# Patient Record
Sex: MALE | Race: WHITE | URBAN - METROPOLITAN AREA
[De-identification: names, ages, dates, MRNs, and addresses within clinical notes are randomized per-mention and may not be internally consistent; named-entity substitution may affect disease eponyms.]

---

## 2019-06-04 DIAGNOSIS — Z31.41 FERTILITY TESTING: Primary | ICD-10-CM

## 2019-06-11 DIAGNOSIS — Z31.41 FERTILITY TESTING: ICD-10-CM

## 2019-06-11 PROCEDURE — 89322 SEMEN ANAL STRICT CRITERIA: CPT

## 2019-06-12 LAB
ABNORMAL SPERM: 98 MORPHOLOGY
ABSTINENCE DAYS: 5 DAYS (ref 2–7)
AGGLUTINATION: NO YES/NO
ANALYSIS TEMP - CENTIGRADE: 23 CENTIGRADE
CELL FRAGMENTS: ABNORMAL %
COLLECTION METHOD: ABNORMAL
COLLECTION SITE: ABNORMAL
CONSENT TO RELEASE TO PARTNER: NO
HEAD DEFECT: 98
IMMATURE SPERM: ABNORMAL %
IMMOTILE: 22 %
LAB RECEIPT TIME: ABNORMAL
LIQUEFIED: YES YES/NO
MIDPIECE DEFECT: 34
NON-PROGRESSIVE MOTILITY: 2 %
NORMAL SPERM: 2 % NORMAL FORMS (ref 4–?)
PROGRESSIVE MOTILITY: 76 % (ref 32–?)
ROUND CELLS: 0.1 MILLION/ML (ref ?–2)
SPECIMEN CONCENTRATION: 56 MILLION/ML (ref 15–?)
SPECIMEN PH: 7.6 PH (ref 7.2–?)
SPECIMEN TYPE: ABNORMAL
SPECIMEN VOL UR: 3.6 ML (ref 1.5–?)
TAIL DEFECT: 9
TIME OF ANALYSIS: ABNORMAL
TOTAL NUMBER: 202 MILLION (ref 39–?)
TOTAL PROGRESSIVE MOTILE: 154 MILLION (ref 15.6–?)
VISCOUS: NO YES/NO
VITALITY: ABNORMAL % (ref 58–?)
WBC SPECIMEN: ABNORMAL %

## 2019-08-28 ENCOUNTER — ANCILLARY PROCEDURE (OUTPATIENT)
Dept: GENERAL RADIOLOGY | Facility: CLINIC | Age: 34
End: 2019-08-28
Attending: FAMILY MEDICINE
Payer: COMMERCIAL

## 2019-08-28 ENCOUNTER — ANCILLARY PROCEDURE (OUTPATIENT)
Dept: MRI IMAGING | Facility: CLINIC | Age: 34
End: 2019-08-28
Attending: FAMILY MEDICINE
Payer: COMMERCIAL

## 2019-08-28 ENCOUNTER — OFFICE VISIT (OUTPATIENT)
Dept: ORTHOPEDICS | Facility: CLINIC | Age: 34
End: 2019-08-28
Payer: COMMERCIAL

## 2019-08-28 DIAGNOSIS — M95.8 OSTEOCHONDRAL DEFECT OF FEMORAL CONDYLE: Primary | ICD-10-CM

## 2019-08-28 DIAGNOSIS — M25.561 ACUTE PAIN OF RIGHT KNEE: ICD-10-CM

## 2019-08-28 LAB — RADIOLOGIST FLAGS: NORMAL

## 2019-08-28 RX ORDER — TRAMADOL HYDROCHLORIDE 50 MG/1
50 TABLET ORAL EVERY 6 HOURS PRN
Qty: 10 TABLET | Refills: 0 | Status: SHIPPED | OUTPATIENT
Start: 2019-08-28 | End: 2019-09-04

## 2019-08-28 NOTE — PROGRESS NOTES
CHIEF COMPLAINT:  Pain of the Right Knee       HISTORY OF PRESENT ILLNESS  Mr. Fernandez is a pleasant 34 year old year old male who presents to clinic today with right knee pain.  Sam explains that it began yesterday.  Stood up from sitting position yesterday caused clicking and mild pain. Clicking continued yesterday.  No significant swelling.  He woke up today however with severe sharp pain of right knee. Knee pain with movement. Unable to flex or fully extend knee.  Worse with motion.  Improved with rest at about 80 degrees flexion.  No significant swelling today.  Felt so poorly that he left called off from work to come in today.    Denies history of right knee pain in adolescence or recently.    Treatment to date: None    Additional history: as documented    MEDICAL HISTORY  Patient Active Problem List   Diagnosis     CARDIOVASCULAR SCREENING; LDL GOAL LESS THAN 160     Bowel habit changes     IBS (irritable bowel syndrome)     Hair loss       Current Outpatient Medications   Medication Sig Dispense Refill     finasteride (PROPECIA) 1 MG tablet Take 1 mg by mouth daily.         No Known Allergies    Family History   Problem Relation Age of Onset     Heart Disease Maternal Grandmother      Heart Disease Maternal Grandfather      Alcohol/Drug Father      Allergies Brother      Arthritis Paternal Grandmother      Lipids Mother      Lipids Maternal Grandmother        Additional medical/Social/Surgical histories reviewed in [employer] and updated as appropriate.     REVIEW OF SYSTEMS (8/28/2019)  A 10-point review of systems was obtained and is negative except for as noted in the HPI.      PHYSICAL EXAM  There were no vitals taken for this visit.    General  - normal appearance, in no obvious distress  HEENT  -Pupils equal, round, no conjunctival injection.  No lid lag  CV  - normal popliteal pulse  Pulm  - normal respiratory pattern, non-labored  Musculoskeletal - Right knee  - stance: nonweightbearing, difficulty to  bear weight or even straighten leg  - inspection: no swelling or effusion, normal bone and joint alignment, no obvious deformity  - palpation: Medial joint line tenderness exquisitely  - ROM: 90 degrees flexion, 60 degrees extension, painful in all motion  - strength: grossly intact flexion and extension  - special tests:  Unable to perform due to limited knee ROM    Neuro  - no sensory or motor deficit, grossly normal coordination, normal muscle tone  Skin  - no ecchymosis, erythema, warmth, or induration, no obvious rash  Psych  - interactive, appropriate, normal mood and affect    IMAGING : XR right knee. Final results and radiologist's interpretation, available in the [facility] health record. Images were reviewed with the patient/family members in the office today. My personal interpretation of the performed imaging is medial femoral condyle with abnormality suggestive of large OCD lesion.     ASSESSMENT & PLAN  Mr. Fernandez is a 34 year old year old male who presents to clinic today with acute right knee pain with no specific trauma x 2 days.  XR revealing findings to suggest large osteochodral defect of medial femoral condyle.    Diagnosis: Acute pain of right knee.    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  Patient was brought back and findings discussed.  An appointment was made with Dr. Garza tomorrow.  Patient to remain NWB.    I spent a total of 40 minutes face-to-face with Sam Fernandez during today's office visit.  Over 50% of this time was spent counseling the patient and/or coordinating care regarding osteochondral lesion of right knee.  See note for details.      It was a pleasure seeing Sam today.    Néstor Freitas DO, CAQSM  Primary Care  Sports Medicine

## 2019-08-28 NOTE — LETTER
Date:August 29, 2019      Patient was self referred, no letter generated. Do not send.        [facility] Health Information

## 2019-08-28 NOTE — LETTER
8/28/2019       RE: Sam Fernandez  [address]  [place] [address]     Dear Colleague,    Thank you for referring your patient, Sam Fernandez, to the [facility]TS AND [facility]THOPAEDIC WALK IN CLINIC at [facility]. Please see a copy of my visit note below.          SPORTS & ORTHOPEDIC WALK-IN VISIT 8/28/2019    Primary Care Physician:     Reason for visit:     What part of your body is injured / painful?  right knee    What caused the injury /pain? Unsure    How long ago did your injury occur or pain begin? yesterday    What are your most bothersome symptoms? Pain    How would you characterize your symptom?  sharp    What makes your symptoms better? Nothing    What makes your symptoms worse? Standing, Movement and Bending    Have you been previously seen for this problem? No    Medical History:    Any recent changes to your medical history? No    Any new medication prescribed since last visit? No    Have you had surgery on this body part before? No    Social History:    Occupation: Educator     Handedness: Right    Exercise: 3-4 days/week    Review of Systems:    Do you have fever, chills, weight loss? No    Do you have any vision problems? No    Do you have any chest pain or edema? No    Do you have any shortness of breath or wheezing?  No    Do you have stomach problems? No    Do you have any numbness or focal weakness? No    Do you have diabetes? No    Do you have problems with bleeding or clotting? No    Do you have an rashes or other skin lesions? No           CHIEF COMPLAINT:  Pain of the Right Knee       HISTORY OF PRESENT ILLNESS  Mr. Fernandez is a pleasant 34 year old year old male who presents to clinic today with right knee pain.  Sam explains that it began yesterday.  Stood up from sitting position yesterday caused clicking and mild pain. Clicking continued yesterday.  No significant swelling.  He woke up today however with severe sharp pain of right knee.  Knee pain with movement. Unable to flex or fully extend knee.  Worse with motion.  Improved with rest at about 80 degrees flexion.  No significant swelling today.  Felt so poorly that he left called off from work to come in today.    Denies history of right knee pain in adolescence or recently.    Treatment to date: None    Additional history: as documented    MEDICAL HISTORY  Patient Active Problem List   Diagnosis     CARDIOVASCULAR SCREENING; LDL GOAL LESS THAN 160     Bowel habit changes     IBS (irritable bowel syndrome)     Hair loss       Current Outpatient Medications   Medication Sig Dispense Refill     finasteride (PROPECIA) 1 MG tablet Take 1 mg by mouth daily.         No Known Allergies    Family History   Problem Relation Age of Onset     Heart Disease Maternal Grandmother      Heart Disease Maternal Grandfather      Alcohol/Drug Father      Allergies Brother      Arthritis Paternal Grandmother      Lipids Mother      Lipids Maternal Grandmother        Additional medical/Social/Surgical histories reviewed in [employer] and updated as appropriate.     REVIEW OF SYSTEMS (8/28/2019)  A 10-point review of systems was obtained and is negative except for as noted in the HPI.      PHYSICAL EXAM  There were no vitals taken for this visit.    General  - normal appearance, in no obvious distress  HEENT  -Pupils equal, round, no conjunctival injection.  No lid lag  CV  - normal popliteal pulse  Pulm  - normal respiratory pattern, non-labored  Musculoskeletal - Right knee  - stance: nonweightbearing, difficulty to bear weight or even straighten leg  - inspection: no swelling or effusion, normal bone and joint alignment, no obvious deformity  - palpation: Medial joint line tenderness exquisitely  - ROM: 90 degrees flexion, 60 degrees extension, painful in all motion  - strength: grossly intact flexion and extension  - special tests:  Unable to perform due to limited knee ROM    Neuro  - no sensory or motor deficit,  grossly normal coordination, normal muscle tone  Skin  - no ecchymosis, erythema, warmth, or induration, no obvious rash  Psych  - interactive, appropriate, normal mood and affect    IMAGING : XR right knee. Final results and radiologist's interpretation, available in the [facility] health record. Images were reviewed with the patient/family members in the office today. My personal interpretation of the performed imaging is medial femoral condyle with abnormality suggestive of large OCD lesion.     ASSESSMENT & PLAN  Mr. Fernandez is a 34 year old year old male who presents to clinic today with acute right knee pain with no specific trauma x 2 days.  XR revealing findings to suggest large osteochodral defect of medial femoral condyle.    Diagnosis: Acute pain of right knee.    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  Patient was brought back and findings discussed.  An appointment was made with Dr. Garza tomorrow.  Patient to remain NWB.    I spent a total of 40 minutes face-to-face with Sam Fernandez during today's office visit.  Over 50% of this time was spent counseling the patient and/or coordinating care regarding osteochondral lesion of right knee.  See note for details.      It was a pleasure seeing Sam today.    Néstor Freitas DO, [facility]  Primary Care Sports Medicine      Again, thank you for allowing me to participate in the care of your patient.      Sincerely,    Néstor Freitas DO

## 2019-08-28 NOTE — PROGRESS NOTES
SPORTS & ORTHOPEDIC WALK-IN VISIT 8/28/2019    Primary Care Physician:     Reason for visit:     What part of your body is injured / painful?  right knee    What caused the injury /pain? Unsure    How long ago did your injury occur or pain begin? yesterday    What are your most bothersome symptoms? Pain    How would you characterize your symptom?  sharp    What makes your symptoms better? Nothing    What makes your symptoms worse? Standing, Movement and Bending    Have you been previously seen for this problem? No    Medical History:    Any recent changes to your medical history? No    Any new medication prescribed since last visit? No    Have you had surgery on this body part before? No    Social History:    Occupation: Educator     Handedness: Right    Exercise: 3-4 days/week    Review of Systems:    Do you have fever, chills, weight loss? No    Do you have any vision problems? No    Do you have any chest pain or edema? No    Do you have any shortness of breath or wheezing?  No    Do you have stomach problems? No    Do you have any numbness or focal weakness? No    Do you have diabetes? No    Do you have problems with bleeding or clotting? No    Do you have an rashes or other skin lesions? No

## 2019-08-29 ENCOUNTER — OFFICE VISIT (OUTPATIENT)
Dept: ORTHOPEDICS | Facility: CLINIC | Age: 34
End: 2019-08-29
Payer: COMMERCIAL

## 2019-08-29 VITALS
HEIGHT: 77 IN | DIASTOLIC BLOOD PRESSURE: 58 MMHG | WEIGHT: 185 LBS | SYSTOLIC BLOOD PRESSURE: 106 MMHG | BODY MASS INDEX: 21.84 KG/M2

## 2019-08-29 DIAGNOSIS — M25.561 ACUTE PAIN OF RIGHT KNEE: Primary | ICD-10-CM

## 2019-08-29 PROCEDURE — 99203 OFFICE O/P NEW LOW 30 MIN: CPT | Mod: 57 | Performed by: ORTHOPAEDIC SURGERY

## 2019-08-29 ASSESSMENT — MIFFLIN-ST. JEOR: SCORE: 1906.53

## 2019-08-29 NOTE — LETTER
8/29/2019         RE: Sam Fernandez  [address]  [place] [address]        Dear Colleague,    Thank you for referring your patient, Sam Fernandez, to the [facility] ORTHOPEDIC SURGERY. Please see a copy of my visit note below.    CHIEF CONCERN: right knee pain    HISTORY:   Mr. Fernandez is a 34 year old male, seen today regarding his acute right knee pain. Onset of right knee pain occurred 2 days ago, 8/27/19 near 2:00am after standing up from a seated position. He states prior to the injury his knee was clicking back and forth with mild discomfort throughout the night. After he stood, he had severe pain, and was unable to weight bear.  No acute swelling in the right knee, he does note mild swelling of the right foot. He states that the pain in his right knee has diminished rapidly, pain was present at the anterior medial aspect of his knee, along his patella.  He states yesterday he was very limited in his ROM due to pain, but today his motion has much improved. He is able to straighten the right leg to almost full extension in the knee.     He went to her acute walk-in clinic they obtained x-rays and an MRI which showed a unstable OCD lesion his case was discussed with me and ultimately he was referred to my clinic for definitive management.    Specifically he denies any problems of locking catching or pain in his knee while he was in high school or as a teenager.    Current pain level: 0/10, Worst pain level: 8/10    Current Treatments:NWB, ace bandage, use of crutches, Ibuprofen, Tramdol prn (no doses today)    PAST MEDICAL HISTORY: (Reviewed with the patient and in the [facility] medical record)  1. None    PAST SURGICAL HISTORY: (Reviewed with the patient and in the [facility] medical record)  1. None, though he does have pending sinus surgery    MEDICATIONS: (Reviewed with the patient and in the [facility] medical record)    Notable medications include: Tramadol prn for severe pain    ALLERGIES: (Reviewed  with the patient and in the [facility] medical record)  1. None      SOCIAL HISTORY: (Reviewed with the patient and in the medical record)  --Tobacco: No smoking  --Occupation: Works at an elementary school  --Avocation/Sport: Enjoys working out.  Admits does not play much sports    FAMILY HISTORY: (Reviewed with the patient and in the medical record)  -- No family history of bleeding, clotting, or difficulty with anesthesia    REVIEW OF SYSTEMS: (Reviewed with the patient and on the health intake form)  -- A comprehensive 10 point review of systems was conducted and is negative except as noted in the HPI    EXAM:     General: Awake, Alert and Oriented, No acute Distress. Articulate and Interactive    Body mass index is 21.94 kg/m .    Right lower extremity :    Skin is Warm and Well perfused, no suggestion of infection    Trace effusion    No definite medial joint line tenderness    Lachman 0, no pivot shift    Stable to varus and valgus stress testing stable anterior and posterior drawer testing    1 quadrant medial translation of the patella 2 quadrants lateral.  Tilt to neutral.    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    MRI: MRI shows an unstable OCD lesion lateral aspect medial femoral condyle.  It is clearly unstable.    ASSESSMENT:  1. 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 Long discussion with the patient I discussed the my thoughts regarding this.  I do think he is a candidate for surgery.  2. I offered an examination under anesthesia right knee, right knee arthroscopy, fragment inspection and repair and bone grafting versus fragment excision.  3. Reviewed with him the risk benefits comp occasions techniques and alternatives to surgery we reviewed the expected course of  recovery and alternative treatment options.  We will look for time to schedule repeat this completed.        Again, thank you for allowing me to participate in the care of your patient.        Sincerely,        Alexandro Garza MD

## 2019-08-29 NOTE — PROGRESS NOTES
CHIEF CONCERN: right knee pain    HISTORY:   Mr. Fernandez is a 34 year old male, seen today regarding his acute right knee pain. Onset of right knee pain occurred 2 days ago, 8/27/19 near 2:00am after standing up from a seated position. He states prior to the injury his knee was clicking back and forth with mild discomfort throughout the night. After he stood, he had severe pain, and was unable to weight bear.  No acute swelling in the right knee, he does note mild swelling of the right foot. He states that the pain in his right knee has diminished rapidly, pain was present at the anterior medial aspect of his knee, along his patella.  He states yesterday he was very limited in his ROM due to pain, but today his motion has much improved. He is able to straighten the right leg to almost full extension in the knee.     He went to her acute walk-in clinic they obtained x-rays and an MRI which showed a unstable OCD lesion his case was discussed with me and ultimately he was referred to my clinic for definitive management.    Specifically he denies any problems of locking catching or pain in his knee while he was in high school or as a teenager.    Current pain level: 0/10, Worst pain level: 8/10    Current Treatments:NWB, ace bandage, use of crutches, Ibuprofen, Tramdol prn (no doses today)    PAST MEDICAL HISTORY: (Reviewed with the patient and in the [facility] medical record)  1. None    PAST SURGICAL HISTORY: (Reviewed with the patient and in the [facility] medical record)  1. None, though he does have pending sinus surgery    MEDICATIONS: (Reviewed with the patient and in the [facility] medical record)    Notable medications include: Tramadol prn for severe pain    ALLERGIES: (Reviewed with the patient and in the [facility] medical record)  1. None      SOCIAL HISTORY: (Reviewed with the patient and in the medical record)  --Tobacco: No smoking  --Occupation: Works at an elementary school  --Avocation/Sport: Enjoys working out.  Admits does  not play much sports    FAMILY HISTORY: (Reviewed with the patient and in the medical record)  -- No family history of bleeding, clotting, or difficulty with anesthesia    REVIEW OF SYSTEMS: (Reviewed with the patient and on the health intake form)  -- A comprehensive 10 point review of systems was conducted and is negative except as noted in the HPI    EXAM:     General: Awake, Alert and Oriented, No acute Distress. Articulate and Interactive    Body mass index is 21.94 kg/m .    Right lower extremity :    Skin is Warm and Well perfused, no suggestion of infection    Trace effusion    No definite medial joint line tenderness    Lachman 0, no pivot shift    Stable to varus and valgus stress testing stable anterior and posterior drawer testing    1 quadrant medial translation of the patella 2 quadrants lateral.  Tilt to neutral.    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    MRI: MRI shows an unstable OCD lesion lateral aspect medial femoral condyle.  It is clearly unstable.    ASSESSMENT:  1. 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 Long discussion with the patient I discussed the my thoughts regarding this.  I do think he is a candidate for surgery.  2. I offered an examination under anesthesia right knee, right knee arthroscopy, fragment inspection and repair and bone grafting versus fragment excision.  3. Reviewed with him the risk benefits comp occasions techniques and alternatives to surgery we reviewed the expected course of recovery and alternative treatment options.  We will look for time to schedule repeat this completed.

## 2019-08-30 ENCOUNTER — TELEPHONE (OUTPATIENT)
Dept: ORTHOPEDICS | Facility: CLINIC | Age: 34
End: 2019-08-30

## 2019-08-30 DIAGNOSIS — M25.561 ACUTE PAIN OF RIGHT KNEE: Primary | ICD-10-CM

## 2019-08-30 NOTE — TELEPHONE ENCOUNTER
Patient is scheduled for surgery with Dr. Garza    Spoke or left message with: Patient    Date of Surgery: 9/4/19    Location: [facility]    Post op: 1 week    Pre-op with surgeon (if applicable): Complete    H&P: Scheduled with [facility] NP clinic 9/3/19    Additional imaging/appointments: N/A    Surgery packet: Received in clinic     Additional comments: Patient informed surgery will depend on insurance authorization, may need to be post-poned if not authorized before surgery day.

## 2019-08-30 NOTE — TELEPHONE ENCOUNTER
[facility] Call Center    Phone Message    May a detailed message be left on voicemail: yes    Reason for Call: Other: Pt called in and stated that in clinic on 8/29 it was discussed that it was he was able to bear weight. Pt did that and is now having pain. He is wanting to know if possibly things need to be adjusted since he is unable to bear weight properly at this time. Please follow up when available. Thank you     Action Taken: Message routed to:  [facility] ([facility] Sports Med

## 2019-08-30 NOTE — TELEPHONE ENCOUNTER
Left voicemail for patient to return call to [facility], provided number for call back.     OK for patient to transition back to partial WB as tolerated, if unable he can return to NWB. He should continue with icing the knee, elevating, and taking Ibuprofen, and Tylenol for pain. Patient was provided with short Rx of Tramadol at  on 8/28/19 and can use sparingly for severe pain. Should work on gentle range of motion of the knee as well.     Christina Kimble, ATC

## 2019-08-30 NOTE — TELEPHONE ENCOUNTER
Returned patient call. He states last night he experienced the sharp jolts of pain in his knee similar to the pain he felt on 8/28/19, but rather than the pain being medial, it felt to be more lateral. He is very concerned that he has dislodged his fracture and it is moving.  When I asked if the pain felt to be deep within the knee or able to touch the knee, he states it felt to be towards the outside of his knee.  He denies any locking sensation within the knee. And denies any trips, slips, twisting, or impacting movements to the knee.     He states that today his pain is very minimal again, and has been managing with Ibuprofen, but afraid to move or bear weight.  I encouraged him to use his crutches while at home, and not to hop on one leg for fear of falling and causing greater injury. I also encouraged him to ice the knee, in addition to continuing his regimen of  ibuprofen he could alternate with Tylenol per instructions listed on the bottle:1000mg every 6 hours with food (Maximum of 3000mg/day)    I was able to reach out to Ashlee John ATC at the [facility] and she was able to coordinate with Walk in Clinic Staff for patient to obtain a brace for support.  I also provided the patient with  Surgery Scheduler: Saniya's number to reschedule surgery at a sooner date per Dr. Garza's availability.     He was agreeable with plan, and will go to the Walk-in Clinic today.  The address was confirmed with patient.       Christina Kimble ATC

## 2019-08-30 NOTE — TELEPHONE ENCOUNTER
Patient left voicemail returning Christina's call. His concern is that there is a fragment of bone floating around his kneecap. When it gets in a certain position,  the pain can be extreme. He feels he does not have control over this.   Surgery isn't scheduled until October 11th.  Nervous of what he is supposed to do for the next 6 weeks when he can't even move around his house. States it is an ordeal and has to hop to and from the bathroom that is 10 feet away.   Asking if surgery was scheduled so far out because it may be considered elective and not urgent.   Yesterday Dr. Garza told him he could put weight on leg. However, since the cartilage fragment seems to move around and cause pain is in a different place than yesterday, he feels it is more serious than initially thought.    He is also nervous about being out of work for 6 weeks.     He can be reached at: [phone].     JERILYN Lara RN

## 2019-09-03 ENCOUNTER — ANESTHESIA EVENT (OUTPATIENT)
Dept: SURGERY | Facility: AMBULATORY SURGERY CENTER | Age: 34
End: 2019-09-03

## 2019-09-03 ENCOUNTER — OFFICE VISIT (OUTPATIENT)
Dept: FAMILY MEDICINE | Facility: CLINIC | Age: 34
End: 2019-09-03
Payer: COMMERCIAL

## 2019-09-03 VITALS
HEART RATE: 72 BPM | TEMPERATURE: 97.8 F | OXYGEN SATURATION: 97 % | DIASTOLIC BLOOD PRESSURE: 71 MMHG | WEIGHT: 190 LBS | HEIGHT: 77 IN | RESPIRATION RATE: 16 BRPM | BODY MASS INDEX: 22.43 KG/M2 | SYSTOLIC BLOOD PRESSURE: 119 MMHG

## 2019-09-03 DIAGNOSIS — Z01.818 PRE-OPERATIVE GENERAL PHYSICAL EXAMINATION: Primary | ICD-10-CM

## 2019-09-03 RX ORDER — TADALAFIL 5 MG/1
5 TABLET ORAL DAILY
COMMUNITY
Start: 2019-08-12

## 2019-09-03 ASSESSMENT — MIFFLIN-ST. JEOR: SCORE: 1929.21

## 2019-09-03 ASSESSMENT — PAIN SCALES - GENERAL: PAINLEVEL: NO PAIN (0)

## 2019-09-03 NOTE — PATIENT INSTRUCTIONS

## 2019-09-03 NOTE — NURSING NOTE
Chief Complaint   Patient presents with     Pre-Op Exam     Pt is here for a pre op exam.         AUDI Damon on 9/3/2019 at 3:19 PM

## 2019-09-03 NOTE — PROGRESS NOTES
[facility] NURSE PRACTITIONER'S CLINIC  [address]  5th Floor  [facility] [address]  [phone]  Dept: [phone]    PRE-OP EVALUATION:  Today's date: 9/3/2019    Sam Fernandez (: 1985) presents for pre-operative evaluation assessment as requested by Dr. Alexandro Garza.  He requires evaluation and anesthesia risk assessment prior to undergoing surgery/procedure for treatment of unstable osteochondritis dissecans .  Proposed procedure: right knee arthroscopy, fragment inspection and repair.    Date of Surgery/ Procedure: 19  Time of Surgery/ Procedure: 1:30 pm  [place] [facility] [place] Physician: Pre-op PE by Felicia Belcher CNP  Type of Anesthesia Anticipated: General    Patient has a Health Care Directive or Living Will:  NO    Preop Questions 9/3/2019   Who is doing your surgery? Dr Garza   What are you having done? arthoscopic knee surgery - right knee   Date of Surgery/Procedure:    Facility or Hospital where procedure/surgery will be performed: [facility]   1.  Do you have a history of Heart attack, stroke, stent, coronary bypass surgery, or other heart surgery? No   2.  Do you ever have any pain or discomfort in your chest? No   3.  Do you have a history of  Heart Failure? No   4.   Are you troubled by shortness of breath when:  walking on a level surface, or up a slight hill, or at night? No   5.  Do you currently have a cold, bronchitis or other respiratory infection? No   6.  Do you have a cough, shortness of breath, or wheezing? No   7.  Do you sometimes get pains in the calves of your legs when you walk? No   8. Do you or anyone in your family have previous history of blood clots? No   9.  Do you or does anyone in your family have a serious bleeding problem such as prolonged bleeding following surgeries or cuts? No   10. Have you ever had problems with anemia or been told to take iron pills? No   11. Have you had  any abnormal blood loss such as black, tarry or bloody stools? No   12. Have you ever had a blood transfusion? No   13. Have you or any of your relatives ever had problems with anesthesia? No   14. Do you have sleep apnea, excessive snoring or daytime drowsiness? No   15. Do you have any prosthetic heart valves? No   16. Do you have prosthetic joints? No         HPI:                                                      Brief HPI related to upcoming procedure: One week ago Sam stood up and felt a severe pain in his right knee.  He did not have any injury, he has felt it clicking at times, but not frequently before one week ago.  The pain was so difficult, he was not able to weight bear.      Sam saw Dr. Garza 8/29/19 who recommended surgery.      MEDICAL HISTORY:                                                      Patient Active Problem List    Diagnosis Date Noted     CARDIOVASCULAR SCREENING; LDL GOAL LESS THAN 160 11/01/2011     Priority: Medium     Bowel habit changes 11/01/2011     Priority: Medium     IBS (irritable bowel syndrome) 11/01/2011     Priority: Medium     Hair loss 11/01/2011     Priority: Medium      Past Medical History:   Diagnosis Date     Hair loss 11/1/2011     IBS (irritable bowel syndrome) 11/1/2011     Traveler's diarrhea may 2011    at , treated with unknown antibiotic at [facility], phone consultaion.     Past Surgical History:   Procedure Laterality Date     testicular tortion  1998     wisdom teeth     Ziaflex procedure for Peyronie's disease  Current Outpatient Medications   Medication Sig Dispense Refill     tadalafil (CIALIS) 5 MG tablet Take 5 mg by mouth daily       finasteride (PROPECIA) 1 MG tablet Take 1 mg by mouth daily.       traMADol (ULTRAM) 50 MG tablet Take 1 tablet (50 mg) by mouth every 6 hours as needed for severe pain (Patient not taking: Reported on 9/3/2019) 10 tablet 0     OTC products: None, except as noted above    No Known Allergies   Latex  "Allergy: NO    Social History     Tobacco Use     Smoking status: Never Smoker     Smokeless tobacco: Never Used   Substance Use Topics     Alcohol use: No     History   Drug Use No       REVIEW OF SYSTEMS:                                                    CONSTITUTIONAL: NEGATIVE for fever, chills, change in weight  INTEGUMENTARY/SKIN: NEGATIVE for worrisome rashes, moles or lesions  EYES: NEGATIVE for vision changes or irritation  ENT/MOUTH: NEGATIVE for ear, mouth and throat problems  RESP: NEGATIVE for significant cough or SOB  BREAST: NEGATIVE for masses, tenderness or discharge  CV: NEGATIVE for chest pain, palpitations or peripheral edema  GI: NEGATIVE for nausea, abdominal pain, heartburn, or change in bowel habits  : NEGATIVE for frequency, dysuria, or hematuria.  History of Pyronie's disease.    MUSCULOSKELETAL: NEGATIVE for significant arthralgias or myalgia  NEURO: NEGATIVE for weakness, dizziness or paresthesias  ENDOCRINE: NEGATIVE for temperature intolerance, skin/hair changes  HEME: NEGATIVE for bleeding problems  PSYCHIATRIC: NEGATIVE for changes in mood or affect    EXAM:                                                    /71 (BP Location: Right arm, Patient Position: Sitting, Cuff Size: Adult Regular)   Pulse 72   Temp 97.8  F (36.6  C) (Oral)   Resp 16   Ht 1.956 m (6' 5\")   Wt 86.2 kg (190 lb)   SpO2 97%   BMI 22.53 kg/m      GENERAL APPEARANCE: healthy, alert and no distress     EYES: EOMI,  PERRL     HENT: ear canals and TM's normal and nose and mouth without ulcers or lesions     NECK: no adenopathy, no asymmetry, masses, or scars and thyroid normal to palpation     RESP: lungs clear to auscultation - no rales, rhonchi or wheezes     CV: regular rates and rhythm, normal S1 S2, no S3 or S4 and no murmur, click or rub     ABDOMEN:  soft, nontender, no HSM or masses and bowel sounds normal     MS: right knee swollen and ROM limited by pain and stiffness.  Painful weight bearing, " using crutches.  Swelling in right ankle.       SKIN: no suspicious lesions or rashes     NEURO: Normal strength and tone, sensory exam grossly normal, mentation intact and speech normal     PSYCH: mentation appears normal. and affect normal/bright     LYMPHATICS: No cervical adenopathy    DIAGNOSTICS:                                                    No labs or EKG required for low risk surgery (cataract, skin procedure, breast biopsy, etc)    No results for input(s): HGB, PLT, INR, NA, POTASSIUM, CR, A1C in the last 54325 hours.     IMPRESSION:                                                    Reason for surgery/procedure: unstable osteochondritis dissecans, repair  Diagnosis/reason for consult: Pre-op physical exam    The proposed surgical procedure is considered INTERMEDIATE risk.    REVISED CARDIAC RISK INDEX  The patient has the following serious cardiovascular risks for perioperative complications such as (MI, PE, VFib and 3  AV Block):  No serious cardiac risks  INTERPRETATION: 0 risks: Class I (very low risk - 0.4% complication rate)    The patient has the following additional risks for perioperative complications:  No identified additional risks    APPROVAL GIVEN to proceed with proposed procedure, without further diagnostic evaluation    No diagnosis found.    RECOMMENDATIONS:                                                    --Consult hospital rounder / IM to assist post-op medical management     Signed Electronically by: Felicia Belcher NP    Copy of this evaluation report is provided to requesting physician.    [place] Preop Guidelines

## 2019-09-04 ENCOUNTER — HOSPITAL ENCOUNTER (OUTPATIENT)
Facility: AMBULATORY SURGERY CENTER | Age: 34
End: 2019-09-04
Attending: ORTHOPAEDIC SURGERY
Payer: COMMERCIAL

## 2019-09-04 ENCOUNTER — ANESTHESIA (OUTPATIENT)
Dept: SURGERY | Facility: AMBULATORY SURGERY CENTER | Age: 34
End: 2019-09-04

## 2019-09-04 ENCOUNTER — ANCILLARY PROCEDURE (OUTPATIENT)
Dept: RADIOLOGY | Facility: AMBULATORY SURGERY CENTER | Age: 34
End: 2019-09-04
Attending: ORTHOPAEDIC SURGERY
Payer: COMMERCIAL

## 2019-09-04 VITALS
OXYGEN SATURATION: 97 % | TEMPERATURE: 96.9 F | RESPIRATION RATE: 15 BRPM | BODY MASS INDEX: 22.43 KG/M2 | DIASTOLIC BLOOD PRESSURE: 66 MMHG | HEART RATE: 52 BPM | WEIGHT: 190 LBS | HEIGHT: 77 IN | SYSTOLIC BLOOD PRESSURE: 116 MMHG

## 2019-09-04 DIAGNOSIS — M95.8 OSTEOCHONDRAL DEFECT: Primary | ICD-10-CM

## 2019-09-04 DIAGNOSIS — R52 PAIN: ICD-10-CM

## 2019-09-04 DEVICE — IMPLANTABLE DEVICE: Type: IMPLANTABLE DEVICE | Site: KNEE | Status: FUNCTIONAL

## 2019-09-04 RX ORDER — ACETAMINOPHEN 325 MG/1
325-650 TABLET ORAL EVERY 6 HOURS PRN
COMMUNITY

## 2019-09-04 RX ORDER — DEXAMETHASONE SODIUM PHOSPHATE 4 MG/ML
INJECTION, SOLUTION INTRA-ARTICULAR; INTRALESIONAL; INTRAMUSCULAR; INTRAVENOUS; SOFT TISSUE PRN
Status: DISCONTINUED | OUTPATIENT
Start: 2019-09-04 | End: 2019-09-04

## 2019-09-04 RX ORDER — PROPOFOL 10 MG/ML
INJECTION, EMULSION INTRAVENOUS PRN
Status: DISCONTINUED | OUTPATIENT
Start: 2019-09-04 | End: 2019-09-04

## 2019-09-04 RX ORDER — ONDANSETRON 2 MG/ML
INJECTION INTRAMUSCULAR; INTRAVENOUS PRN
Status: DISCONTINUED | OUTPATIENT
Start: 2019-09-04 | End: 2019-09-04

## 2019-09-04 RX ORDER — AMOXICILLIN 250 MG
1-2 CAPSULE ORAL 2 TIMES DAILY
Qty: 30 TABLET | Refills: 0 | Status: SHIPPED | OUTPATIENT
Start: 2019-09-04

## 2019-09-04 RX ORDER — MEPERIDINE HYDROCHLORIDE 25 MG/ML
12.5 INJECTION INTRAMUSCULAR; INTRAVENOUS; SUBCUTANEOUS
Status: DISCONTINUED | OUTPATIENT
Start: 2019-09-04 | End: 2019-09-05 | Stop reason: HOSPADM

## 2019-09-04 RX ORDER — CEFAZOLIN SODIUM 2 G/50ML
2 SOLUTION INTRAVENOUS
Status: COMPLETED | OUTPATIENT
Start: 2019-09-04 | End: 2019-09-04

## 2019-09-04 RX ORDER — ACETAMINOPHEN 325 MG/1
975 TABLET ORAL ONCE
Status: COMPLETED | OUTPATIENT
Start: 2019-09-04 | End: 2019-09-04

## 2019-09-04 RX ORDER — OMEGA-3 FATTY ACIDS/FISH OIL 300-1000MG
400 CAPSULE ORAL EVERY 4 HOURS PRN
COMMUNITY

## 2019-09-04 RX ORDER — ACETAMINOPHEN 325 MG/1
650 TABLET ORAL EVERY 4 HOURS PRN
Qty: 50 TABLET | Refills: 0 | Status: SHIPPED | OUTPATIENT
Start: 2019-09-04

## 2019-09-04 RX ORDER — OXYCODONE HYDROCHLORIDE 5 MG/1
5 TABLET ORAL EVERY 4 HOURS PRN
Status: DISCONTINUED | OUTPATIENT
Start: 2019-09-04 | End: 2019-09-05 | Stop reason: HOSPADM

## 2019-09-04 RX ORDER — FENTANYL CITRATE 50 UG/ML
INJECTION, SOLUTION INTRAMUSCULAR; INTRAVENOUS PRN
Status: DISCONTINUED | OUTPATIENT
Start: 2019-09-04 | End: 2019-09-04

## 2019-09-04 RX ORDER — HYDROXYZINE HYDROCHLORIDE 25 MG/1
25 TABLET, FILM COATED ORAL
Status: COMPLETED | OUTPATIENT
Start: 2019-09-04 | End: 2019-09-04

## 2019-09-04 RX ORDER — OXYCODONE HYDROCHLORIDE 5 MG/1
5 TABLET ORAL
Status: COMPLETED | OUTPATIENT
Start: 2019-09-04 | End: 2019-09-04

## 2019-09-04 RX ORDER — NALOXONE HYDROCHLORIDE 0.4 MG/ML
.1-.4 INJECTION, SOLUTION INTRAMUSCULAR; INTRAVENOUS; SUBCUTANEOUS
Status: DISCONTINUED | OUTPATIENT
Start: 2019-09-04 | End: 2019-09-05 | Stop reason: HOSPADM

## 2019-09-04 RX ORDER — PROPOFOL 10 MG/ML
INJECTION, EMULSION INTRAVENOUS CONTINUOUS PRN
Status: DISCONTINUED | OUTPATIENT
Start: 2019-09-04 | End: 2019-09-04

## 2019-09-04 RX ORDER — ONDANSETRON 4 MG/1
4 TABLET, ORALLY DISINTEGRATING ORAL
Status: DISCONTINUED | OUTPATIENT
Start: 2019-09-04 | End: 2019-09-05 | Stop reason: HOSPADM

## 2019-09-04 RX ORDER — KETOROLAC TROMETHAMINE 30 MG/ML
INJECTION, SOLUTION INTRAMUSCULAR; INTRAVENOUS PRN
Status: DISCONTINUED | OUTPATIENT
Start: 2019-09-04 | End: 2019-09-04

## 2019-09-04 RX ORDER — CEFAZOLIN SODIUM 1 G/50ML
1 SOLUTION INTRAVENOUS SEE ADMIN INSTRUCTIONS
Status: DISCONTINUED | OUTPATIENT
Start: 2019-09-04 | End: 2019-09-04 | Stop reason: HOSPADM

## 2019-09-04 RX ORDER — ONDANSETRON 2 MG/ML
4 INJECTION INTRAMUSCULAR; INTRAVENOUS EVERY 30 MIN PRN
Status: DISCONTINUED | OUTPATIENT
Start: 2019-09-04 | End: 2019-09-05 | Stop reason: HOSPADM

## 2019-09-04 RX ORDER — BUPIVACAINE HYDROCHLORIDE AND EPINEPHRINE 2.5; 5 MG/ML; UG/ML
INJECTION, SOLUTION INFILTRATION; PERINEURAL PRN
Status: DISCONTINUED | OUTPATIENT
Start: 2019-09-04 | End: 2019-09-04 | Stop reason: HOSPADM

## 2019-09-04 RX ORDER — SODIUM CHLORIDE, SODIUM LACTATE, POTASSIUM CHLORIDE, CALCIUM CHLORIDE 600; 310; 30; 20 MG/100ML; MG/100ML; MG/100ML; MG/100ML
INJECTION, SOLUTION INTRAVENOUS CONTINUOUS
Status: DISCONTINUED | OUTPATIENT
Start: 2019-09-04 | End: 2019-09-04 | Stop reason: HOSPADM

## 2019-09-04 RX ORDER — ACETAMINOPHEN 325 MG/1
650 TABLET ORAL
Status: DISCONTINUED | OUTPATIENT
Start: 2019-09-04 | End: 2019-09-05 | Stop reason: HOSPADM

## 2019-09-04 RX ORDER — FENTANYL CITRATE 50 UG/ML
25-50 INJECTION, SOLUTION INTRAMUSCULAR; INTRAVENOUS
Status: DISCONTINUED | OUTPATIENT
Start: 2019-09-04 | End: 2019-09-04 | Stop reason: HOSPADM

## 2019-09-04 RX ORDER — ONDANSETRON 4 MG/1
4-8 TABLET, ORALLY DISINTEGRATING ORAL EVERY 8 HOURS PRN
Qty: 4 TABLET | Refills: 0 | Status: SHIPPED | OUTPATIENT
Start: 2019-09-04

## 2019-09-04 RX ORDER — GABAPENTIN 300 MG/1
300 CAPSULE ORAL ONCE
Status: COMPLETED | OUTPATIENT
Start: 2019-09-04 | End: 2019-09-04

## 2019-09-04 RX ORDER — ONDANSETRON 4 MG/1
4 TABLET, ORALLY DISINTEGRATING ORAL EVERY 30 MIN PRN
Status: DISCONTINUED | OUTPATIENT
Start: 2019-09-04 | End: 2019-09-05 | Stop reason: HOSPADM

## 2019-09-04 RX ORDER — OXYCODONE HYDROCHLORIDE 5 MG/1
5-10 TABLET ORAL EVERY 4 HOURS PRN
Qty: 30 TABLET | Refills: 0 | Status: SHIPPED | OUTPATIENT
Start: 2019-09-04 | End: 2019-09-11

## 2019-09-04 RX ORDER — SODIUM CHLORIDE, SODIUM LACTATE, POTASSIUM CHLORIDE, CALCIUM CHLORIDE 600; 310; 30; 20 MG/100ML; MG/100ML; MG/100ML; MG/100ML
INJECTION, SOLUTION INTRAVENOUS CONTINUOUS
Status: DISCONTINUED | OUTPATIENT
Start: 2019-09-04 | End: 2019-09-05 | Stop reason: HOSPADM

## 2019-09-04 RX ORDER — LIDOCAINE HYDROCHLORIDE 20 MG/ML
INJECTION, SOLUTION INFILTRATION; PERINEURAL PRN
Status: DISCONTINUED | OUTPATIENT
Start: 2019-09-04 | End: 2019-09-04

## 2019-09-04 RX ADMIN — CEFAZOLIN SODIUM 2 G: 2 SOLUTION INTRAVENOUS at 13:50

## 2019-09-04 RX ADMIN — GABAPENTIN 300 MG: 300 CAPSULE ORAL at 12:17

## 2019-09-04 RX ADMIN — KETOROLAC TROMETHAMINE 30 MG: 30 INJECTION, SOLUTION INTRAMUSCULAR; INTRAVENOUS at 14:10

## 2019-09-04 RX ADMIN — ONDANSETRON 4 MG: 2 INJECTION INTRAMUSCULAR; INTRAVENOUS at 14:04

## 2019-09-04 RX ADMIN — PROPOFOL 200 MCG/KG/MIN: 10 INJECTION, EMULSION INTRAVENOUS at 13:55

## 2019-09-04 RX ADMIN — LIDOCAINE HYDROCHLORIDE 100 MG: 20 INJECTION, SOLUTION INFILTRATION; PERINEURAL at 13:52

## 2019-09-04 RX ADMIN — SODIUM CHLORIDE, SODIUM LACTATE, POTASSIUM CHLORIDE, CALCIUM CHLORIDE: 600; 310; 30; 20 INJECTION, SOLUTION INTRAVENOUS at 12:18

## 2019-09-04 RX ADMIN — HYDROXYZINE HYDROCHLORIDE 25 MG: 25 TABLET, FILM COATED ORAL at 16:17

## 2019-09-04 RX ADMIN — PROPOFOL 200 MG: 10 INJECTION, EMULSION INTRAVENOUS at 13:54

## 2019-09-04 RX ADMIN — FENTANYL CITRATE 50 MCG: 50 INJECTION, SOLUTION INTRAMUSCULAR; INTRAVENOUS at 14:02

## 2019-09-04 RX ADMIN — OXYCODONE HYDROCHLORIDE 5 MG: 5 TABLET ORAL at 16:17

## 2019-09-04 RX ADMIN — DEXAMETHASONE SODIUM PHOSPHATE 4 MG: 4 INJECTION, SOLUTION INTRA-ARTICULAR; INTRALESIONAL; INTRAMUSCULAR; INTRAVENOUS; SOFT TISSUE at 14:04

## 2019-09-04 RX ADMIN — PROPOFOL 100 MG: 10 INJECTION, EMULSION INTRAVENOUS at 13:55

## 2019-09-04 RX ADMIN — ACETAMINOPHEN 975 MG: 325 TABLET ORAL at 12:17

## 2019-09-04 ASSESSMENT — MIFFLIN-ST. JEOR: SCORE: 1929.21

## 2019-09-04 NOTE — ANESTHESIA CARE TRANSFER NOTE
Patient: Sam Fernandez    Procedure(s):  Examination Under Anesthesia Right Knee, Right Knee Arthroscopy, OCD fixation    Diagnosis: Unstable Osteochondral Dissecans Lesions  Diagnosis Additional Information: No value filed.    Anesthesia Type:   General     Note:  Airway :Oral Airway and Room Air  Patient transferred to:PACU  Comments: Blue Report: Identifed the Patient, Identified the Reponsible Provider, Reviewed the pertinent medical history, Discussed the surgical course, Reviewed Intra-OP anesthesia mangement and issues during anesthesia, Set expectations for post-procedure period and Allowed opportunity for questions and acknowledgement of understanding      Vitals: (Last set prior to Anesthesia Care Transfer)    CRNA VITALS  9/4/2019 1522 - 9/4/2019 1556      9/4/2019             Pulse:  52    SpO2:  99 %    Resp Rate (observed):  9                Electronically Signed By: ANGELO Vora CRNA  September 4, 2019  3:56 PM

## 2019-09-04 NOTE — OP NOTE
PREOPERATIVE DIAGNOSIS:   1. Right knee medial femoral condyle unstable osteochondritis dissecans lesion    POSTOPERATIVE DIAGNOSIS:  1. Right knee medial femoral condyle unstable osteochondritis dissecans lesion, repairable    PROCEDURE:  1. Examination under anesthesia right knee  2. Right knee arthroscopy  3. Open reduction internal fixation and bone grafting of osteochondritis dissecans lesion medial femoral condyle right knee    DATE OF SURGERY: 9/4/2019    SURGEON: Alexandro Garza MD    ASSISTANT: None.     RESIDENT OR FELLOW: Tristan Wilson MD    OPERATIVE INDICATIONS: Sam Fernandez is a pleasant 34 year old male who I saw through my orthopedic clinic with a history, physical, imaging consistent with right unstable osteochondritis dissecans lesion.  Present to my clinic.  He actually did very painful episode.  Then he was not painful I ultimately offered him surgery.  The plan for surgery was examination under anesthesia, knee arthroscopy and inspection of the fragment.  If it was repairable I then plan for open reduction internal fixation and with bone grafting from his proximal tibia.  I also discussed with him the possibility of fragment excision of the need for future cartilage reconstruction we discussed the divergent recovery pathways the fact that repair of this OCD lesion may allow improvement in the long-term health of his knee and may be able to avoid cartilage reconstructive surgery.  I reviewed with the patient the risks, benefits, complications, techniques and alternatives to surgery.  We reviewed the expected course of recovery and the potential expected outcomes.  The patient understood both the risks and benefits and desired to proceed despite the risks.    OPERATIVE DETAILS: In the preoperative area the patient's informed consent was reviewed and they desired to proceed.  The right leg was marked and the patient was in agreement.  The patient was taken to the operating room where a timeout  was performed and all parties were in agreement.  Preoperative antibiotics were given within 1 hour of the time of incision.  The patient was placed in the supine position and surrendered to LMA anesthesia.  No tourniquet was applied.  Egg crate was placed beneath the well leg and a side post was utilized.  The operative leg was prepped and draped in the usual sterile fashion.     Examination Under Anesthesia:    Anteromedial and anterolateral arthroscopic portals were created and a diagnostic arthroscopy was performed to find findings: Medial tibial plateau medial meniscus normal lateral femoral condyle, lateral tibial plateau, lateral meniscus normal medial patella facet, central ridge lateral patella facet normal trochlea normal.  Medial femoral condyle showed a large unstable OCD lesion.  Clearly ballotable.  It appeared intact to repair.  There was bone in the deep aspect of it.  At this time the medial portal was utilized carried out through the skin and subcutaneous tissues meticulous hemostasis was insured.  Medial parapatellar arthrotomy was completed.  We opened the fragment and a curette was used to debride the bed to good bed of bleeding bone.  Marrow venting was then completed with a small-caliber drill bit and again we saw good bed of bleeding bone.  We then turned our attention to Ana Luisa's tubercle where a 2 cm incision was made carried onto the skin and subcutis tissues meticulous hemostasis was insured cortical window was opened and autogenous bone was harvested from the proximal tibia.  This was then placed into the defect.  We replaced a cortical window on the lateral side.  And a layered closure was initiated.  With the fragment gently opened our bone graft was placed into the defect the fragment was then returned it was held in place with a dental pick and to small-caliber K wires from the [employer] mini headless compression screw set were utilized.  2 headless compression screws were placed.   Excellent purchase.  Excellent compression.  Final images showed good position of the screws.  Copious irrigation was performed and a layered closure was initiated with 1 Vicryl 2-0 Vicryl Monocryl.    The patient was transferred to the recovery room in stable condition with stable vital signs.    ESTIMATED BLOOD LOSS: 25 mL.    TOURNIQUET TIME: No tourniquet was placed.    COMPLICATIONS: None apparent.    DRAINS: None.    SPECIMENS: None.     POSTOPERATIVE PLAN:  1. Patient will be allowed to discharge home when he meets the same day discharge criteria  2. Toe-touch weightbearing x6 weeks  3. No brace  4. Range of motion as tolerated  5. CPM 0-30 advance as tolerated to goal 0 to full 2 hours 3 times daily x1 week then 2 hours twice daily x3 weeks then discontinue  6. Follow-up 1 week with AP and lateral radiographs

## 2019-09-04 NOTE — ANESTHESIA POSTPROCEDURE EVALUATION
Anesthesia POST Procedure Evaluation    Patient: Sam Fernandez   MRN:     [id number] Gender:   male   Age:    34 year old :      1985        Preoperative Diagnosis: Unstable Osteochondral Dissecans Lesions   Procedure(s):  Examination Under Anesthesia Right Knee, Right Knee Arthroscopy, OCD fixation   Postop Comments: No value filed.       Anesthesia Type:  Not documented  General    Reportable Event: NO     PAIN: Uncomplicated   Sign Out status: Comfortable, Well controlled pain     PONV: No PONV   Sign Out status:  No Nausea or Vomiting     Neuro/Psych: Uneventful perioperative course   Sign Out Status: Preoperative baseline; Age appropriate mentation     Airway/Resp.: Uneventful perioperative course   Sign Out Status: Non labored breathing, age appropriate RR; Resp. Status within EXPECTED Parameters     CV: Uneventful perioperative course   Sign Out status: Appropriate BP and perfusion indices; Appropriate HR/Rhythm     Disposition:   Sign Out in:  PACU  Disposition:  Phase II; Home  Recovery Course: Uneventful  Follow-Up: Not required           Last Anesthesia Record Vitals:  CRNA VITALS  2019 1522 - 2019 1622      2019             Pulse:  52    SpO2:  99 %    Resp Rate (observed):  9          Last PACU Vitals:  Vitals Value Taken Time   /72 2019  4:17 PM   Temp 36.1  C (97  F) 2019  4:17 PM   Pulse 56 2019  4:17 PM   Resp 15 2019  4:26 PM   SpO2 99 % 2019  4:26 PM   Temp src     NIBP     Pulse     SpO2     Resp     Temp     Ht Rate     Temp 2           Electronically Signed By: Mattie Mcgee MD, 2019, 5:03 PM

## 2019-09-04 NOTE — DISCHARGE INSTRUCTIONS
"[facility]  Home Care Following Anesthesia  For 24 hours after surgery:  1. Get plenty of rest.  A responsible adult must stay with you for at least 24 hours after you leave the surgery center.  2. Do not drive or use heavy equipment.  If you have weakness or tingling, don't drive or use heavy equipment until this feeling goes away.   3. Do not drink alcohol.   4. Avoid strenuous or risky activities.  Ask for help when climbing stairs.  5. You may feel lightheaded.  IF so, sit for a few minutes before standing.  Have someone help you get up.   6. If you have nausea (feel sick to your stomach): Drink only clear liquids such as apple juice, ginger ale, broth or 7-Up.  Rest may also help.  Be sure to drink enough fluids.  Move to a regular diet as you feel able.   7. You may have a slight fever.  Call the doctor if your fever is over 100 F (37.7 C) (taken under the tongue) or lasts longer than 24 hours.  8. You may have a dry mouth, a sore throat, muscle aches or trouble sleeping. These should go away after 24 hours.  9. Do not make important or legal decisions.        Today you received a Marcaine or bupivacaine block to numb the nerves near your surgery site.  This is a block using local anesthetic or \"numbing\" medication injected around the nerves to anesthetize or \"numb\" the area supplied by those nerves.  This block is injected into the muscle layer near your surgical site.  The medication may numb the location where you had surgery for 6-18 hours, but may last up to 24 hours.  If your surgical site is an arm or leg you should be careful with your affected limb, since it is possible to injure your limb without being aware of it due to the numbing.  Until full feeling returns, you should guard against bumping or hitting your limb, and avoid extreme hot or cold temperatures on the skin.  As the block wears off, the feeling will return as a tingling or prickly sensation near your " surgical site.  You will experience more discomfort from your incision as the feeling returns.  You may want to take a pain pill (a narcotic or Tylenol if this was prescribed by your surgeon) when you start to experience mild pain before the pain beccomes more severe.  If your pain medications do not control your pain you should notifiy your surgeon.    Tips for taking pain medications  To get the best pain relief possible, remember these points:    Take pain medications as directed, before pain becomes severe.    Pain medication can upset your stomach: taking it with food may help.    Constipation is a common side effect of pain medication. Drink plenty of  fluids.    Eat foods high in fiber. Take a stool softener if recommended by your doctor or pharmacist.    Do not drink alcohol, drive or operate machinery while taking pain medications.    Ask about other ways to control pain, such as with heat, ice or relaxation.    Tylenol/Acetaminophen Consumption  To help encourage the safe use of acetaminophen, the makers of TYLENOL  have lowered the maximum daily dose for single-ingredient Extra Strength TYLENOL  (acetaminophen) products sold in the [place] from 8 pills per day (4,000 mg) to 6 pills per day (3,000 mg). The dosing interval has also changed from 2 pills every 4-6 hours to 2 pills every 6 hours.    If you feel your pain relief is insufficient, you may take Tylenol/Acetaminophen in addition to your narcotic pain medication.     Be careful not to exceed 3,000 mg of Tylenol/Acetaminophen in a 24 hour period from all sources.    If you are taking extra strength Tylenol/acetaminophen (500 mg), the maximum dose is 6 tablets in 24 hours.    If you are taking regular strength acetaminophen (325 mg), the maximum dose is 9 tablets in 24 hours.    Tylenol 975mg given at 12:17pm. Next dose available after 6:17pm. Then follow package instructions.     Call a doctor for any of the followin. Signs of infection (fever,  "growing tenderness at the surgery site, a large amount of drainage or bleeding, severe pain, foul-smelling drainage, redness, swelling).  2. It has been over 8 to 10 hours since surgery and you are still not able to urinate (pass water).  3. Headache for over 24 hours.    Your doctor is:       Dr. Alexandro Garza, Orthopaedics: [phone]               Or dial [phone] and ask for the resident on call for:  Orthopaedics  [place] emergency care, call the:  [facility] [place] [facility] [phone] (TTY for hearing impaired: [phone])      Safety Tips for Using Crutches    Crutch Fit:    Assume good standing posture with shoulders relaxed and crutch tips 6-8 inches out from the side of the foot.    The underarm pad should fall 2-3 fingers width below the armpit.    The handgrip is positioned level with the wrist to allow 30  flexion at the elbow.    Safety Tips:    Bear weight on your hands, not on your armpits.    Do not add extra padding to the underarm pad. This will, in effect, lengthen the crutches and increase risk of nerve injury.    Wear flat, properly fitting shoes. Do not walk in stocking feet, high heels or slippers.    Household hazards:  --Throw rugs should be removed from floors.  --Stairs should be cleared of obstacles.  --Use extra caution on slippery, highly polished, littered or uneven floor surfaces.  --Check for electric cords.    Check crutch tips for excessive wear and keep wing nuts tight.    While walking, look forward with  head up  and  eyes open.  Take equal length steps.    Use BOTH crutches.    Stairs Sequence:    UP: \"Good\" leg first, followed by  bad  leg, then crutches.    DOWN: Crutches, followed by  bad  leg, \"good\" leg.     Walking with Crutches:    Move both crutches forward at the same time.    Non-Weight Bearing (NWB):  Hold the involved leg up and swing through the crutches with the involved leg. The involved leg does not touch the floor.    Toe Touch Weight Bearing " (TTWB): Move the involved leg forward. Rest it lightly on the floor for balance only. Step through the crutches with the uninvolved leg.    Partial Weight Bearing (PWB): Move the involved leg forward. Step down the weight of the leg only.  Step through the crutches with the uninvolved leg.    Weight Bearing As Tolerated (WBAT): Move the involved leg forward. Put as much pressure through the involved leg as you can tolerate comfortably. Then step through the crutches with the uninvolved leg.

## 2019-09-04 NOTE — ANESTHESIA PREPROCEDURE EVALUATION
"Anesthesia Pre-Procedure Evaluation    Patient: Sam Fernandez   MRN:     [id number] Gender:   male   Age:    34 year old :      1985        Preoperative Diagnosis: Unstable Osteochondral Dissecans Lesions   Procedure(s):  Examination Under Anesthesia Right Knee, Right Knee Arthroscopy, Fragment Excision Versus Open Bone Grafting and Repair     Past Medical History:   Diagnosis Date     Hair loss 2011     IBS (irritable bowel syndrome) 2011     Traveler's diarrhea may 2011    at , treated with unknown antibiotic at [facility], phone consultaion.      Past Surgical History:   Procedure Laterality Date     testicular tortion  1998     wisdom teeth                     PHYSICAL EXAM:   Mental Status/Neuro: A/A/O   Airway: Facies: Feasible  Mallampati: I  Mouth/Opening: Full  TM distance: > 6 cm  Neck ROM: Full   Respiratory: Auscultation: CTAB     Resp. Rate: Normal     Resp. Effort: Normal      CV: Rhythm: Regular  Rate: Age appropriate  Heart: Normal Sounds  Edema: None   Comments:                      LABS:  CBC: No results found for: WBC, HGB, HCT, PLT  BMP: No results found for: NA, POTASSIUM, CHLORIDE, CO2, BUN, CR, GLC  COAGS: No results found for: PTT, INR, FIBR  POC: No results found for: BGM, HCG, HCGS  OTHER: No results found for: PH, LACT, A1C, JANETTE, PHOS, MAG, ALBUMIN, PROTTOTAL, ALT, AST, GGT, ALKPHOS, BILITOTAL, BILIDIRECT, LIPASE, AMYLASE, PREMA, TSH, T4, T3, CRP, SED     Preop Vitals    BP Readings from Last 3 Encounters:   19 119/71   19 106/58   11 98/70    Pulse Readings from Last 3 Encounters:   19 72   11 84      Resp Readings from Last 3 Encounters:   19 16   11 12    SpO2 Readings from Last 3 Encounters:   19 97%      Temp Readings from Last 1 Encounters:   19 36.6  C (97.8  F) (Oral)    Ht Readings from Last 1 Encounters:   19 1.956 m (6' 5\")      Wt Readings from Last 1 Encounters:   19 86.2 kg (190 lb)    " "Estimated body mass index is 22.53 kg/m  as calculated from the following:    Height as of 9/3/19: 1.956 m (6' 5\").    Weight as of 9/3/19: 86.2 kg (190 lb).     LDA:        Assessment:   ASA SCORE: 1    H&P: History and physical reviewed and following examination; no interval change.    NPO Status: NPO Appropriate     Plan:   Anes. Type:  General   Pre-Medication: None   Induction:  IV (Standard)   Airway: LMA   Access/Monitoring: PIV   Maintenance: Balanced     Postop Plan:   Postop Pain: Opioids  Postop Sedation/Airway: Not planned     PONV Management:   Adult Risk Factors:, Postop Opioids   Prevention: Ondansetron     CONSENT: Direct conversation   Plan and risks discussed with: Patient; Mother   Blood Products: N/a                   Mattie Mcgee MD  "

## 2019-09-05 ENCOUNTER — TELEPHONE (OUTPATIENT)
Dept: ORTHOPEDICS | Facility: CLINIC | Age: 34
End: 2019-09-05

## 2019-09-05 NOTE — TELEPHONE ENCOUNTER
Returned call to patient. Discussed that patient should use this CPM 0-30 advance as tolerated to goal 0 to full 2 hours 3 times daily x1 week then 2 hours twice daily x3 weeks then discontinue.     Patient inquired about limits of Tylenol; informed patient that he should not use more than 3500 to 4000 mg of Tylenol in a 24-hour period. Patient expressed understanding.     Patient is deciding where he would like to attend physical therapy and will call with the location so we can fax orders and protocol.

## 2019-09-05 NOTE — TELEPHONE ENCOUNTER
[facility] Call Center    Phone Message    May a detailed message be left on voicemail: yes    Reason for Call: Other: pt has a CPM machine and they have questions as to the frequency for using it, also has questions regarding Tylenol medication, please call pt's mother back to discuss     Action Taken: Message routed to:  [facility] Sports Med

## 2019-09-06 ENCOUNTER — NURSE TRIAGE (OUTPATIENT)
Dept: CALL CENTER | Age: 34
End: 2019-09-06

## 2019-09-06 NOTE — TELEPHONE ENCOUNTER
Returned call; spoke with patient's Mom Raina with patient's permission. Mom says patient is feeling better now, that the pain medications have taken his pain level down to the tolerable range. They will continue to ice 20 minutes every hour and elevate. Patient will use the CPM as prescribed now that his pain is in good control. They will remove dressing at 72 hours and patient may shower. They are scheduled for an appointment with Dr. Garza on Wednesday 9/11. They will call with any further questions or concerns before then.

## 2019-09-06 NOTE — TELEPHONE ENCOUNTER
[facility]: Nurse Triage Note  SITUATION/BACKGROUND                                                      Sam Sullivan a 34 year old male who calls with:  Mother Raina calling- Sam in backgroung.  Pt having increase right knee pain- pain meds not managing pain. Should pt use his TPM machine- would this help with pain.  PROCEDURE:DATE OF SURGERY: 9/4/2019   1. Examination under anesthesia right knee  2. Right knee arthroscopy  3. Open reduction internal fixation and bone grafting of osteochondritis dissecans lesion medial femoral condyle right knee   SURGEON: Alexandro Garza MD      Description: Constant Pain - twisting, stabbing, pressure in right knee.  Onset/duration:  Last evening. Pain had been managed with pain meds until then- did go without meds for about  8 hours on Thursday, pain had been minimal.  Wednesday post surgery til Thursday AM took Tylenol and 1 oxycodone (5 mg tab) every 4 hours.   Friday 2AM, 6:30 AM took 2 oxycodone and tylenol  Pain remains 8:10      Able to move toes, color normal skin color, warm to touch.  Denies fever. Dressing dry/intact no new drainage since post surgery.  Swelling on right knee, no increase since surgery. Able to move toes without difficulty, no swelling in toes.  Keeping right knee elevated, icing. Have tried reposition to help with pain.    Pharmacy target-[facility] on [address]        MEDICATIONS:   Taking medication(s) as prescribed? Yes  Taking over the counter medication(s?) Yes  Any barriers to taking medication(s) as prescribed?  No  Medication(s) improving/managing symptoms?  No    Allergies: No Known Allergies    ASSESSMENT      Post op right knee pain not controlled by pain medication.    RECOMMENDATION/PLAN                                                      RECOMMENDED DISPOSITION:  Will send high priority note onto ortho clinic for recommendation on pain management and if to use TPM machine. If pain increase, change  in motion to RLE, seek emergent care. Call back it Ortho clinic has not returned call with the hour.   Will comply with recommendation: Yes    Zach Paris cell # [phone]    If further questions/concerns or if symptoms do not improve, worsen or new symptoms develop, call your PCP or [phone] to talk with the Resident on call, as soon as possible.    Guideline used: post op problems, knee pain  Telephone Triage Protocols for Nurses, Fifth Edition, Glenny Batista, RN, RN

## 2019-09-09 ENCOUNTER — NURSE TRIAGE (OUTPATIENT)
Dept: CALL CENTER | Age: 34
End: 2019-09-09

## 2019-09-09 NOTE — TELEPHONE ENCOUNTER
[facility]: Nurse Triage Note  SITUATION/BACKGROUND                                                      Sam Fernandez is a 34 year old male, mom (Raina) calling with post op questions.    PROCEDURE: DATE OF SURGERY: 9/4/2019  1. Examination under anesthesia right knee  2. Right knee arthroscopy  3. Open reduction internal fixation and bone grafting of osteochondritis dissecans lesion medial femoral condyle right knee   SURGEON: Alexandro aGrza MD    Pt having increase pain when ambulating with crutches- 'leg seems to swing side to side'- wondering if wearing the leg brace will help.  The brace was given to him prior to surgery.  When will PT start? Should pt be scheduling PT appts.    Pt has appt 9-11-19 with Dr. Garza.    RECOMMENDED DISPOSITION: will send high priority note onto ortho and a message left on [facility]. To call back with recommendation/answer to questions. Reviewed with Raina that PT referral will probably done at the post op visit on 9-11-19. To call back if she does not hear back from  Ortho clinic today.  Will comply with recommendation: Yes   Mom # [phone], pt # [phone]    If further questions/concerns or if symptoms do not improve, worsen or new symptoms develop, call your PCP or [phone] to talk with the Resident on call, as soon as possible.      Marci Batista, RN, RN

## 2019-09-10 DIAGNOSIS — Z98.890 S/P RIGHT KNEE SURGERY: Primary | ICD-10-CM

## 2019-09-10 NOTE — TELEPHONE ENCOUNTER
Returned call. Patient will be seeing Dr. Garza tomorrow and will ask about wearing the brace they received preop. Confirmed that patient should start PT within 1 week of surgery. They will call for an appointment.

## 2019-09-11 ENCOUNTER — ANCILLARY PROCEDURE (OUTPATIENT)
Dept: GENERAL RADIOLOGY | Facility: CLINIC | Age: 34
End: 2019-09-11
Attending: ORTHOPAEDIC SURGERY
Payer: COMMERCIAL

## 2019-09-11 ENCOUNTER — OFFICE VISIT (OUTPATIENT)
Dept: ORTHOPEDICS | Facility: CLINIC | Age: 34
End: 2019-09-11
Payer: COMMERCIAL

## 2019-09-11 DIAGNOSIS — Z98.890 S/P RIGHT KNEE SURGERY: Primary | ICD-10-CM

## 2019-09-11 DIAGNOSIS — M95.8 OSTEOCHONDRAL DEFECT: ICD-10-CM

## 2019-09-11 DIAGNOSIS — Z98.890 S/P RIGHT KNEE SURGERY: ICD-10-CM

## 2019-09-11 RX ORDER — OXYCODONE HYDROCHLORIDE 5 MG/1
5-10 TABLET ORAL EVERY 4 HOURS PRN
Qty: 30 TABLET | Refills: 0 | Status: SHIPPED | OUTPATIENT
Start: 2019-09-11

## 2019-09-11 NOTE — PROGRESS NOTES
DIAGNOSIS:   Unstable osteochondritis dissecans lateral aspect of medial femoral condyle right knee  PROCEDURES:  1. Open reduction internal fixation unstable OCD lesion right knee.  Date of surgery 9/4/2019    HISTORY:  Doing well 1 week out from surgery.  Taking 1 narcotic pain pill at night.  Already returned to work.  Been observant of our restrictions.    EXAM:     General: Awake, Alert, and oriented. Articulates and communicates with a normal affect     Right lower Extremity:    Incisions well healed without evidence of infection    Normal post-operative effusion and ecchymosis    Range of motion and stability exam not performed    Neurovascularly intact    IMAGING:  AP and lateral radiographs show OCD lesion with good approximation headless compression screws are intact.    No evidence of OCD lesion on the contralateral knee.    ASSESSMENT:  1. 1 week status post fixation of OCD lesion right knee.  Doing well.    PLAN:     Toe-touch weightbearing x6 weeks    Range of motion as tolerated    CPM as ordered    Sutures removed in clinic    Leave steri-strips in place until they fall off    OK to shower allowing water to run over incision    No soaking, scrubbing, baths, or lake for 1 additional week    Start PT as scheduled     Pain medications reviewed and no refills required.     Operative report provided and arthroscopic images reviewed    Follow up at 6 weeks from the date of surgery with no new X-Rays needed

## 2019-09-13 ENCOUNTER — TELEPHONE (OUTPATIENT)
Dept: ORTHOPEDICS | Facility: CLINIC | Age: 34
End: 2019-09-13

## 2019-09-13 NOTE — TELEPHONE ENCOUNTER
[facility] Call Center    Phone Message    May a detailed message be left on voicemail: yes    Reason for Call: Other: Pt states that Kayla's team printed pt an Physical Therapy order. When pt brought the printed copy to PT, they told pt that it's not actually an order with instructions/protocols for therapy. It seems to be missing information. Please refax PT order to :  [facility]  Fax#: [phone]  Ph#: [phone] option #2, [facility].     Action Taken: Message routed to:  [facility] sports

## 2019-09-16 ENCOUNTER — TELEPHONE (OUTPATIENT)
Dept: ORTHOPEDICS | Facility: CLINIC | Age: 34
End: 2019-09-16

## 2019-09-16 NOTE — TELEPHONE ENCOUNTER
[facility] Call Center    Phone Message    May a detailed message be left on voicemail: yes    Reason for Call: Other: pt calling in with questions. Pt wants to know can he take the strips off his knee? Pt wants to know can he use silacone bandages to help prevent scaring? Also pt would like to know is it okay to do simple upper body workouts using dumbells? Please call the pt back to discuss.     Action Taken: Message routed to:  [facility] orthopedics

## 2019-09-16 NOTE — TELEPHONE ENCOUNTER
Returned call to patient, who is 2 weeks s/p right knee arthroscopy, open reduction internal fixation and bone grafting of osteochondritis dissecans lesion medial femoral condyle. He asks when he can take off his steri strips, he had his sutures removed on 9/11/19. He was informed that he may take them off at this time, but if he prefers to leave them on for a few more days, that is fine as well. He asked when he could submerge his knee underwater, advised to wait until 3 weeks post-operative. He was advised against using anything to prevent scaring, as it could lead to infection if the incision is still open. He may begin simple upper body workouts as tolerated. He had no further questions at this time.

## 2019-10-09 ENCOUNTER — OFFICE VISIT (OUTPATIENT)
Dept: ORTHOPEDICS | Facility: CLINIC | Age: 34
End: 2019-10-09
Payer: COMMERCIAL

## 2019-10-09 DIAGNOSIS — Z98.890 S/P RIGHT KNEE SURGERY: Primary | ICD-10-CM

## 2019-10-09 NOTE — PROGRESS NOTES
DIAGNOSIS:   Unstable osteochondritis dissecans lateral aspect of medial femoral condyle right knee    PROCEDURES:  1. Open reduction internal fixation unstable OCD lesion right knee.  Date of surgery 9/4/2019    HISTORY:  Doing well 6 weeks out from the above surgery.  Not take any pain medicine.  He is doing therapy.  He is feels like he is making good progress.  He has flexion to 120 degrees.    EXAM:     General: Awake, Alert, and oriented. Articulates and communicates with a normal affect     Right lower Extremity:    Incisions well healed without evidence of infection    No post-operative effusion or ecchymosis    Range of motion and stability exam not performed    Neurovascularly intact    IMAGING:  No new images today    ASSESSMENT:  1. Weeks status post fixation of OCD lesion right knee.  Doing well.    PLAN:   Weightbearing as tolerated  Wean from crutches when able  Range of motion as tolerated  Okay to bike  No running jumping or impact sports  Continue to do therapy, work on motion,  Follow-up in my clinic in 6 weeks time with AP and lateral radiographs

## 2019-10-09 NOTE — LETTER
10/9/2019      RE: Sam RENU Jim  [address]  [place] [address]       DIAGNOSIS:   Unstable osteochondritis dissecans lateral aspect of medial femoral condyle right knee    PROCEDURES:  1. Open reduction internal fixation unstable OCD lesion right knee.  Date of surgery 9/4/2019    HISTORY:  Doing well 6 weeks out from the above surgery.  Not take any pain medicine.  He is doing therapy.  He is feels like he is making good progress.  He has flexion to 120 degrees.    EXAM:     General: Awake, Alert, and oriented. Articulates and communicates with a normal affect     Right lower Extremity:    Incisions well healed without evidence of infection    No post-operative effusion or ecchymosis    Range of motion and stability exam not performed    Neurovascularly intact    IMAGING:  No new images today    ASSESSMENT:  1. Weeks status post fixation of OCD lesion right knee.  Doing well.    PLAN:   Weightbearing as tolerated  Wean from crutches when able  Range of motion as tolerated  Okay to bike  No running jumping or impact sports  Continue to do therapy, work on motion,  Follow-up in my clinic in 6 weeks time with AP and lateral radiographs      Alexandro Garza MD

## 2019-10-09 NOTE — NURSING NOTE
Reason For Visit:   Chief Complaint   Patient presents with     Surgical Followup     DOS 9/4/19 Examination Under Anesthesia Right Knee, Right Knee Arthroscopy, OCD fixation       Date of surgery: 9/4/19  Type of surgery:   PROCEDURE:  1. Examination under anesthesia right knee  2. Right knee arthroscopy  3. Open reduction internal fixation and bone grafting of osteochondritis dissecans lesion medial femoral condyle right knee    Smoker: No  Request smoking cessation information: No    Pain Assessment  Patient Currently in Pain: Yes  Primary Pain Location: Knee  Pain Descriptors: Sore    There were no vitals taken for this visit.       No Known Allergies    Current Outpatient Medications   Medication     acetaminophen (TYLENOL) 325 MG tablet     finasteride (PROPECIA) 1 MG tablet     ibuprofen (ADVIL/MOTRIN) 200 MG capsule     tadalafil (CIALIS) 5 MG tablet     acetaminophen (TYLENOL) 325 MG tablet     ondansetron (ZOFRAN-ODT) 4 MG ODT tab     oxyCODONE (ROXICODONE) 5 MG tablet     senna-docusate (SENOKOT-S/PERICOLACE) 8.6-50 MG tablet     No current facility-administered medications for this visit.          Ashlee John, ATC

## 2019-11-19 DIAGNOSIS — Z98.890 S/P RIGHT KNEE SURGERY: Primary | ICD-10-CM

## 2019-11-20 ENCOUNTER — ANCILLARY PROCEDURE (OUTPATIENT)
Dept: GENERAL RADIOLOGY | Facility: CLINIC | Age: 34
End: 2019-11-20
Attending: ORTHOPAEDIC SURGERY
Payer: COMMERCIAL

## 2019-11-20 ENCOUNTER — OFFICE VISIT (OUTPATIENT)
Dept: ORTHOPEDICS | Facility: CLINIC | Age: 34
End: 2019-11-20
Payer: COMMERCIAL

## 2019-11-20 DIAGNOSIS — M95.8 OSTEOCHONDRAL DEFECT OF FEMORAL CONDYLE: Primary | ICD-10-CM

## 2019-11-20 DIAGNOSIS — Z98.890 S/P RIGHT KNEE SURGERY: ICD-10-CM

## 2019-11-20 NOTE — NURSING NOTE
Reason For Visit:   Chief Complaint   Patient presents with     Surgical Followup     DOS 9/4/19 Examination Under Anesthesia Right Knee, Right Knee Arthroscopy, OCD fixation         Date of surgery: 9/4/19  Type of surgery:   PROCEDURE:  1. Examination under anesthesia right knee  2. Right knee arthroscopy  3. Open reduction internal fixation and bone grafting of osteochondritis dissecans lesion medial femoral condyle right knee    Smoker: No  Request smoking cessation information: No    Pain Assessment  Patient Currently in Pain: Yes  0-10 Pain Scale: 2  Primary Pain Location: Knee  Pain Descriptors: Discomfort    There were no vitals taken for this visit.       No Known Allergies    Current Outpatient Medications   Medication     acetaminophen (TYLENOL) 325 MG tablet     finasteride (PROPECIA) 1 MG tablet     ibuprofen (ADVIL/MOTRIN) 200 MG capsule     tadalafil (CIALIS) 5 MG tablet     acetaminophen (TYLENOL) 325 MG tablet     ondansetron (ZOFRAN-ODT) 4 MG ODT tab     oxyCODONE (ROXICODONE) 5 MG tablet     senna-docusate (SENOKOT-S/PERICOLACE) 8.6-50 MG tablet     No current facility-administered medications for this visit.          Ashlee John, ATC

## 2019-11-20 NOTE — LETTER
11/20/2019      RE: Sam SEARS Jim  [address]  [place] [address]       DIAGNOSIS:   Unstable osteochondritis dissecans lateral aspect of medial femoral condyle right knee    PROCEDURES:  1. Open reduction internal fixation unstable OCD lesion right knee.  Date of surgery 9/4/2019    HISTORY:  Doing well 12 weeks out from the above surgery.  Off narcotics.  He is progressing with therapy though is limited by some residual quad weakness.  Is good motion.  No crepitation.    EXAM:     General: Awake, Alert, and oriented. Articulates and communicates with a normal affect     Right lower Extremity:    Incisions well healed without evidence of infection    No post-operative effusion or ecchymosis    Range of motion and stability exam not performed    Neurovascularly intact    IMAGING:  Plain radiographs today show no change in position of the screws.  Good positioning.    ASSESSMENT:  1. 12 weeks status post fixation of OCD lesion right knee.  Doing well.    PLAN:   Weightbearing as tolerated  Range of motion as tolerated  Continue to progress strengthening  No specific limitations at this time progress back to all desired activities as tolerated  Follow-up in 3 months with me 6 months from surgery we will have a discussion regarding elective screw removal at that time    Alexandro Garza MD

## 2019-11-20 NOTE — PROGRESS NOTES
DIAGNOSIS:   Unstable osteochondritis dissecans lateral aspect of medial femoral condyle right knee    PROCEDURES:  1. Open reduction internal fixation unstable OCD lesion right knee.  Date of surgery 9/4/2019    HISTORY:  Doing well 12 weeks out from the above surgery.  Off narcotics.  He is progressing with therapy though is limited by some residual quad weakness.  Is good motion.  No crepitation.    EXAM:     General: Awake, Alert, and oriented. Articulates and communicates with a normal affect     Right lower Extremity:    Incisions well healed without evidence of infection    No post-operative effusion or ecchymosis    Range of motion and stability exam not performed    Neurovascularly intact    IMAGING:  Plain radiographs today show no change in position of the screws.  Good positioning.    ASSESSMENT:  1. 12 weeks status post fixation of OCD lesion right knee.  Doing well.    PLAN:   Weightbearing as tolerated  Range of motion as tolerated  Continue to progress strengthening  No specific limitations at this time progress back to all desired activities as tolerated  Follow-up in 3 months with me 6 months from surgery we will have a discussion regarding elective screw removal at that time

## 2020-03-13 ENCOUNTER — MYC MEDICAL ADVICE (OUTPATIENT)
Dept: ORTHOPEDICS | Facility: CLINIC | Age: 35
End: 2020-03-13

## 2020-03-13 NOTE — TELEPHONE ENCOUNTER
Left voicemail for patient to discuss patient's scheduled appointment on 3/18/20 with Dr. Garza. Offered a telephone visit, requested a callback for further information. Callback number was left.     No active MyChart to send information.

## 2020-03-13 NOTE — TELEPHONE ENCOUNTER
Called and talked to Gamal.    Explained that due to COVID-19, [facility] is taking steps to try to limit potential patient exposures by reducing face to face visits. A way to do this is by offering telephone visits as an alternative option for their follow-up care.    We have reviewed schedules with Dr. Garza and together feel that their follow-up appointment, currently scheduled on 3/18/20, could be done via telephone.    Patient proceeding with their regularly scheduled appointment.    Telephone Visit  Billing information reviewed: No, explain: coming in for regular visit  Time information reviewed: Yes  Contact Number Verified: NA    Cancelling Visit  They will call to reschedule: NA  Their appointment was rescheduled for: NA    Regularly Scheduled Appointment  Patient informed of: visitor restriction, screening process, and parking. They know to allow extra time to get to their appointment due to increased screening and potential changes to parking.    All of their questions were answered at this time, they will call with any new questions that may arise.    Ashlee ATC

## 2020-03-16 NOTE — TELEPHONE ENCOUNTER
Left voicemail for patient, advising him that we can either do a telephone visit on Wednesday at his scheduled time or he can reschedule his visit to 4 weeks out from now. Callback number was left to confirm how he'd like to proceed.

## 2020-03-18 ENCOUNTER — VIRTUAL VISIT (OUTPATIENT)
Dept: ORTHOPEDICS | Facility: CLINIC | Age: 35
End: 2020-03-18
Payer: COMMERCIAL

## 2020-03-18 DIAGNOSIS — Z98.890 S/P RIGHT KNEE SURGERY: Primary | ICD-10-CM

## 2020-03-19 ENCOUNTER — ANCILLARY PROCEDURE (OUTPATIENT)
Dept: GENERAL RADIOLOGY | Facility: CLINIC | Age: 35
End: 2020-03-19
Attending: ORTHOPAEDIC SURGERY
Payer: COMMERCIAL

## 2020-03-19 DIAGNOSIS — Z98.890 S/P RIGHT KNEE SURGERY: ICD-10-CM

## 2020-03-24 ENCOUNTER — TELEPHONE (OUTPATIENT)
Dept: ORTHOPEDICS | Facility: CLINIC | Age: 35
End: 2020-03-24

## 2020-03-24 NOTE — TELEPHONE ENCOUNTER
----- Message from Alexandro Garza MD sent at 3/23/2020  4:55 PM CDT -----  I had a chance to review the imaging study on Sam Fernandez  Can you please call the patient and explain the following:  Good position of the screws within the osteochondral lesion.  They have not backed out, they have not changed position.  The OCD lesion appears stable.    With that said, I think the plan should be to continue with elective screw removal at his desired time point.  There is no emergency to take this out.  The screws are not causing any damage.      Please document that you contact the patient, ok to offer a followup visit to discuss with me if the patient wants.

## 2020-03-24 NOTE — TELEPHONE ENCOUNTER
Called patient and gave him the below x-ray results. He was also informed that, although he can schedule the hardware removal whenever he is ready, we are still under a state mandate which is not allowing elective surgery. He verbalized understanding and will call us at a later date to schedule.

## 2020-03-24 NOTE — TELEPHONE ENCOUNTER
Sam was called back regarding Dr Garza's review of pt's recent imaging and message below.  Pt was left voicemail to call us back so we can review.  Monica Laurent RN      ----- Message from Alexandro Garza MD sent at 3/23/2020  4:55 PM CDT -----  I had a chance to review the imaging study on Sam Fernandez  Can you please call the patient and explain the following:  Good position of the screws within the osteochondral lesion.  They have not backed out, they have not changed position.  The OCD lesion appears stable.    With that said, I think the plan should be to continue with elective screw removal at his desired time point.  There is no emergency to take this out.  The screws are not causing any damage.      Please document that you contact the patient, ok to offer a followup visit to discuss with me if the patient wants.

## 2020-12-18 ENCOUNTER — TRANSFERRED RECORDS (OUTPATIENT)
Dept: HEALTH INFORMATION MANAGEMENT | Facility: CLINIC | Age: 35
End: 2020-12-18

## 2022-09-30 NOTE — RESULT ENCOUNTER NOTE
I had a chance to review the imaging study on Sam Fernandez  Can you please call the patient and explain the following:  Good position of the screws within the osteochondral lesion.  They have not backed out, they have not changed position.  The OCD lesion appears stable.    With that said, I think the plan should be to continue with elective screw removal at his desired time point.  There is no emergency to take this out.  The screws are not causing any damage.      Please document that you contact the patient, ok to offer a followup visit to discuss with me if the patient wants. No

## 2024-08-07 ENCOUNTER — TELEPHONE (OUTPATIENT)
Dept: FAMILY MEDICINE | Facility: CLINIC | Age: 39
End: 2024-08-07
Payer: COMMERCIAL

## 2024-08-07 NOTE — TELEPHONE ENCOUNTER
General Call    Contacts       Contact Date/Time Type Contact Phone/Fax    08/07/2024 04:02 PM CDT Phone (Incoming) Sam Fernandez (Self)           Reason for Call:  order for semen analysis with strict morphology    What are your questions or concerns:  [facility]'s provider sent the order on June 26. Did you receive it?  Writer tried claling but the lab is closed    Date of last appointment with provider: N/A    Okay to leave a detailed message?: Yes at Cell number on file:    Telephone Information:   Mobile [phone]     Please call the client to inform him if the order has been received.  If not, please cancel the appointment.

## 2024-09-24 ENCOUNTER — LAB (OUTPATIENT)
Dept: LAB | Facility: CLINIC | Age: 39
End: 2024-09-24
Payer: COMMERCIAL

## 2024-09-24 DIAGNOSIS — Z31.41 ENCOUNTER FOR SPERM COUNT FOR FERTILITY TESTING: ICD-10-CM

## 2024-09-24 PROCEDURE — 89322 SEMEN ANAL STRICT CRITERIA: CPT

## 2024-10-01 LAB
ABNORMAL SPERM MORPHOLOGY: 100
ABSTINENCE DAYS: 4 DAYS (ref 2–7)
AGGLUTINATION: NO
ANALYSIS TEMP - CENTIGRADE: 22 CENTIGRADE
COLLECTION METHOD: ABNORMAL
COLLECTION SITE: ABNORMAL
CONSENT TO RELEASE TO PARTNER: NO
DAL- RECEIVED TIME: ABNORMAL
HEAD DEFECT: 100 %
IMMOTILE: 53 %
LIQUEFIED: YES
MIDPIECE DEFECT: 19 %
NON-PROGRESSIVE MOTILITY: 3 %
NORMAL SPERM MORPHOLOGY: 0 % NORMAL FORMS
PROGRESSIVE MOTILITY: 44 %
ROUND CELLS: 0.5 MILLION/ML
SPECIMEN PH: 7.2 PH
SPECIMEN VOLUME: 2.7 ML
SPERM CONCENTRATION: 21.4 MILLION/ML
TAIL DEFECT: 19 %
TIME OF ANALYSIS: ABNORMAL
TOTAL PROGRESSIVE MOTILE NUMBER: 26 MILLION
TOTAL SPERM NUMBER: 58 MILLION
VISCOUS: NO
VITALITY: ABNORMAL

## (undated) DEVICE — Device

## (undated) DEVICE — PREP DURAPREP 26ML APL 8630

## (undated) DEVICE — SYR BULB IRRIG 50ML LATEX FREE 0035280

## (undated) DEVICE — SPONGE RAY-TEC 4X8" 7318

## (undated) DEVICE — LINEN TOWEL PACK X5 5464

## (undated) DEVICE — SUCTION MANIFOLD NEPTUNE 2 SYS 4 PORT 0702-020-000

## (undated) DEVICE — SU VICRYL 1 CT-1 27" UND J261H

## (undated) DEVICE — GLOVE PROTEXIS POWDER FREE SMT 7.0  2D72PT70X

## (undated) DEVICE — LINEN ORTHO PACK 5446

## (undated) DEVICE — PACK ARTHROSCOPY CUSTOM ASC

## (undated) DEVICE — GLOVE PROTEXIS POWDER FREE 7.0 ORTHOPEDIC 2D73ET70

## (undated) DEVICE — GLOVE PROTEXIS POWDER FREE SMT 8.0  2D72PT80X

## (undated) DEVICE — SU ETHILON 3-0 PS-1 18" 1663G

## (undated) DEVICE — TUBING SYSTEM ARTHREX PATIENT REDEUCE AR-6421

## (undated) DEVICE — SU VICRYL 0 CT-1 27" J340H

## (undated) DEVICE — PAD ARMBOARD FOAM EGGCRATE COVIDEN 3114367

## (undated) DEVICE — SU MONOCRYL 3-0 PS-1 27" Y936H

## (undated) DEVICE — SOL NACL 0.9% IRRIG 3000ML BAG 2B7477

## (undated) DEVICE — ESU PENCIL SMOKE EVAC W/ROCKER SWITCH 0703-047-000

## (undated) DEVICE — SU VICRYL 2-0 CT-2 27" UND J269H

## (undated) RX ORDER — HYDROXYZINE HYDROCHLORIDE 25 MG/1
TABLET, FILM COATED ORAL
Status: DISPENSED
Start: 2019-09-04

## (undated) RX ORDER — PROPOFOL 10 MG/ML
INJECTION, EMULSION INTRAVENOUS
Status: DISPENSED
Start: 2019-09-04

## (undated) RX ORDER — FENTANYL CITRATE 50 UG/ML
INJECTION, SOLUTION INTRAMUSCULAR; INTRAVENOUS
Status: DISPENSED
Start: 2019-09-04

## (undated) RX ORDER — DEXAMETHASONE SODIUM PHOSPHATE 4 MG/ML
INJECTION, SOLUTION INTRA-ARTICULAR; INTRALESIONAL; INTRAMUSCULAR; INTRAVENOUS; SOFT TISSUE
Status: DISPENSED
Start: 2019-09-04

## (undated) RX ORDER — CEFAZOLIN SODIUM 2 G/50ML
SOLUTION INTRAVENOUS
Status: DISPENSED
Start: 2019-09-04

## (undated) RX ORDER — GABAPENTIN 300 MG/1
CAPSULE ORAL
Status: DISPENSED
Start: 2019-09-04

## (undated) RX ORDER — LIDOCAINE HYDROCHLORIDE 20 MG/ML
INJECTION, SOLUTION EPIDURAL; INFILTRATION; INTRACAUDAL; PERINEURAL
Status: DISPENSED
Start: 2019-09-04

## (undated) RX ORDER — ACETAMINOPHEN 325 MG/1
TABLET ORAL
Status: DISPENSED
Start: 2019-09-04

## (undated) RX ORDER — OXYCODONE HYDROCHLORIDE 5 MG/1
TABLET ORAL
Status: DISPENSED
Start: 2019-09-04

## (undated) RX ORDER — ONDANSETRON 2 MG/ML
INJECTION INTRAMUSCULAR; INTRAVENOUS
Status: DISPENSED
Start: 2019-09-04